# Patient Record
Sex: MALE | Race: WHITE | NOT HISPANIC OR LATINO | URBAN - METROPOLITAN AREA
[De-identification: names, ages, dates, MRNs, and addresses within clinical notes are randomized per-mention and may not be internally consistent; named-entity substitution may affect disease eponyms.]

---

## 2018-05-14 ENCOUNTER — EMERGENCY (EMERGENCY)
Facility: HOSPITAL | Age: 53
LOS: 1 days | Discharge: HOME | End: 2018-05-14
Attending: EMERGENCY MEDICINE
Payer: COMMERCIAL

## 2018-05-14 VITALS
WEIGHT: 229.94 LBS | OXYGEN SATURATION: 100 % | SYSTOLIC BLOOD PRESSURE: 143 MMHG | RESPIRATION RATE: 14 BRPM | DIASTOLIC BLOOD PRESSURE: 92 MMHG | HEIGHT: 70 IN | TEMPERATURE: 98 F | HEART RATE: 94 BPM

## 2018-05-14 VITALS
TEMPERATURE: 98 F | RESPIRATION RATE: 17 BRPM | HEART RATE: 84 BPM | DIASTOLIC BLOOD PRESSURE: 82 MMHG | SYSTOLIC BLOOD PRESSURE: 132 MMHG | OXYGEN SATURATION: 98 %

## 2018-05-14 PROCEDURE — 12002 RPR S/N/AX/GEN/TRNK2.6-7.5CM: CPT

## 2018-05-14 PROCEDURE — 99283 EMERGENCY DEPT VISIT LOW MDM: CPT | Mod: 25

## 2018-05-14 PROCEDURE — 90471 IMMUNIZATION ADMIN: CPT

## 2018-05-14 PROCEDURE — 90715 TDAP VACCINE 7 YRS/> IM: CPT

## 2018-05-14 RX ORDER — TETANUS TOXOID, REDUCED DIPHTHERIA TOXOID AND ACELLULAR PERTUSSIS VACCINE, ADSORBED 5; 2.5; 8; 8; 2.5 [IU]/.5ML; [IU]/.5ML; UG/.5ML; UG/.5ML; UG/.5ML
0.5 SUSPENSION INTRAMUSCULAR ONCE
Qty: 0 | Refills: 0 | Status: COMPLETED | OUTPATIENT
Start: 2018-05-14 | End: 2018-05-14

## 2018-05-14 RX ORDER — CEPHALEXIN 500 MG
1 CAPSULE ORAL
Qty: 14 | Refills: 0 | OUTPATIENT
Start: 2018-05-14 | End: 2018-05-20

## 2018-05-14 RX ORDER — CEPHALEXIN 500 MG
500 CAPSULE ORAL ONCE
Qty: 0 | Refills: 0 | Status: COMPLETED | OUTPATIENT
Start: 2018-05-14 | End: 2018-05-14

## 2018-05-14 RX ADMIN — TETANUS TOXOID, REDUCED DIPHTHERIA TOXOID AND ACELLULAR PERTUSSIS VACCINE, ADSORBED 0.5 MILLILITER(S): 5; 2.5; 8; 8; 2.5 SUSPENSION INTRAMUSCULAR at 18:14

## 2018-05-14 RX ADMIN — Medication 500 MILLIGRAM(S): at 18:14

## 2018-05-14 NOTE — ED PROVIDER NOTE - OBJECTIVE STATEMENT
Pt. present to ED stating that he got a cut to his abdomen while using a saw at work. Pt. not sure of his tetanus status. Pt. also has a cut to his right thumb. No other injuries. Pt. in the ED, bleeding controlled. Pt. works as a shelly.

## 2018-05-14 NOTE — ED PROVIDER NOTE - PROGRESS NOTE DETAILS
Laceration repaired. Pt. did not want the laceration to his right Thumb to be repaired. Dressing placed on both lacerations. Wound care instructions given to patient.

## 2018-05-14 NOTE — ED PROVIDER NOTE - GASTROINTESTINAL, MLM
Abdomen soft, non-tender and non-distended without organomegaly or masses. Normal bowel sounds. laceration to abdomen.

## 2018-05-14 NOTE — ED ADULT NURSE NOTE - OBJECTIVE STATEMENT
Pt. received alert and oriented x4 with chief complaint of laceration to upper midline abdomen and lower midline chest while working with saw.

## 2018-05-14 NOTE — ED PROVIDER NOTE - SKIN WOUND TYPE
7cm Linear laceration through skin and subcutaneous tissue of Left Mid abdomen. No muscle layers involved. No active bleeding./LACERATION(S)

## 2018-05-14 NOTE — ED PROVIDER NOTE - MUSCULOSKELETAL, MLM
Spine appears normal, range of motion is not limited, no muscle or joint tenderness. Superficial Vertical  laceration(2cm) to distal pad of Right thumb.

## 2021-12-04 ENCOUNTER — FORM ENCOUNTER (OUTPATIENT)
Age: 56
End: 2021-12-04

## 2021-12-05 ENCOUNTER — TRANSCRIPTION ENCOUNTER (OUTPATIENT)
Age: 56
End: 2021-12-05

## 2021-12-06 ENCOUNTER — TRANSCRIPTION ENCOUNTER (OUTPATIENT)
Age: 56
End: 2021-12-06

## 2022-02-25 ENCOUNTER — TRANSCRIPTION ENCOUNTER (OUTPATIENT)
Age: 57
End: 2022-02-25

## 2023-08-30 ENCOUNTER — APPOINTMENT (OUTPATIENT)
Dept: ORTHOPEDIC SURGERY | Facility: CLINIC | Age: 58
End: 2023-08-30
Payer: COMMERCIAL

## 2023-08-30 ENCOUNTER — NON-APPOINTMENT (OUTPATIENT)
Age: 58
End: 2023-08-30

## 2023-08-30 DIAGNOSIS — M25.511 PAIN IN RIGHT SHOULDER: ICD-10-CM

## 2023-08-30 DIAGNOSIS — M25.811 OTHER SPECIFIED JOINT DISORDERS, RIGHT SHOULDER: ICD-10-CM

## 2023-08-30 PROBLEM — Z00.00 ENCOUNTER FOR PREVENTIVE HEALTH EXAMINATION: Status: ACTIVE | Noted: 2023-08-30

## 2023-08-30 PROCEDURE — 99203 OFFICE O/P NEW LOW 30 MIN: CPT

## 2023-08-30 PROCEDURE — 73030 X-RAY EXAM OF SHOULDER: CPT | Mod: RT

## 2023-08-30 NOTE — DISCUSSION/SUMMARY
[de-identified] : Discussed findings of today's exam and possible causes of patient's pain.  Educated patient on their most probable diagnosis of right shoulder impingement.  Reviewed possible courses of treatment, and we collaboratively decided best course of treatment at this time will include conservative management.  Patient started on a course of oral NSAIDs, prescription given for Mobic (We discussed all possible side effects of this medication).  Patient will be started on a course of physical therapy to restore normal range of motion and strength as tolerated.  We discussed appropriate role and timing of cortisone injection, patient is insulin-dependent diabetic, recommend utilization of these above conservative measures first, may consider cortisone injection if not improving.  Follow up as needed.  Patient and spouse appreciate and agree with current plan.  I work as part of an academic orthopedic group and routinely have a physician in training (resident / fellow) working with me.  Any part of the history and physical exam performed by the physician in training was either directly reviewed and/or replicated by myself.  Any procedure performed by the physician in training was performed under my direct supervision and with the consent of the patient.  This note was generated using dragon medical dictation software.  A reasonable effort has been made for proofreading its contents, but typos may still remain.  If there are any questions or points of clarification needed please notify my office.

## 2023-08-30 NOTE — HISTORY OF PRESENT ILLNESS
[de-identified] : RHD Patient is here for right shoulder pain that began over a month ago. There was no inciting injury. This is his first evaluation. He tried ice to treat it. Denies prior injury. He works a labor intensive job.   The patient's past medical history, past surgical history, medications and allergies were reviewed by me today and documented accordingly. In addition, the patient's family and social history, which were noncontributory to this visit, were reviewed also. Intake form was reviewed. The patient has no family history of arthritis.

## 2023-08-30 NOTE — PHYSICAL EXAM
[de-identified] : Constitutional: Well-nourished, well-developed, No acute distress Respiratory:  Good respiratory effort, no SOB Psychiatric: Pleasant and normal affect, alert and oriented x3 Skin: Clean dry and intact B/L UE Musculoskeletal: normal except where as noted in regional exam   Left Shoulder: APPEARANCE: no marked deformities, no swelling or malalignment POSITIVE TENDERNESS: none NONTENDER: supraspinatus, infraspinatus, teres minor, LH biceps, anterior and posterior capsule, AC joint ROM: full & painless, no scapular winging or dyskinesia present RESISTIVE TESTING: painless 5/5 resisted flex/ext, empty can/ER/IR, horizontal abd/add  SPECIAL TESTS: neg Drop Arm, neg Empty Can, neg Yousif/Neers, neg Mckinley's, neg Speeds, neg Apprehension, neg cross arm adduction, neg apley's scratch test  Right Shoulder: APPEARANCE: no marked deformities, no swelling or malalignment POSITIVE TENDERNESS: supraspinatus, long head biceps tendon NONTENDER:  infraspinatus, teres minor. biceps. anterior and posterior capsule. AC joint.  ROM: full with mild painful arc past 60 degrees, no scapular winging or dyskinesia present RESISTIVE TESTING: MMT 4+/5 ER, Flexion and Empty can, 5/5 IR. painless 5/5 resisted ext, horizontal abd/add  SPECIAL TESTS: + Yousif and Neers, mildly + cross arm adduction, + Speeds, neg Mckinley's, neg Drop Arm, neg Apprehension. neg apley's scratch test  [de-identified] : The following radiographs were ordered and read by me during this patient's visit. I reviewed each radiograph in detail with the patient and discussed the findings as highlighted below.   3 views of the right shoulder were obtained today that show no fracture, or dislocation. There are no degenerative changes seen. There is no malalignment. No obvious osseous abnormality. Otherwise unremarkable.

## 2023-09-28 ENCOUNTER — OFFICE (OUTPATIENT)
Dept: URBAN - METROPOLITAN AREA CLINIC 109 | Facility: CLINIC | Age: 58
Setting detail: OPHTHALMOLOGY
End: 2023-09-28
Payer: COMMERCIAL

## 2023-09-28 DIAGNOSIS — H17.9: ICD-10-CM

## 2023-09-28 DIAGNOSIS — B00.52: ICD-10-CM

## 2023-09-28 DIAGNOSIS — E11.9: ICD-10-CM

## 2023-09-28 PROCEDURE — 92004 COMPRE OPH EXAM NEW PT 1/>: CPT | Performed by: OPHTHALMOLOGY

## 2023-09-28 PROCEDURE — 92025 CPTRIZED CORNEAL TOPOGRAPHY: CPT | Performed by: OPHTHALMOLOGY

## 2023-09-28 ASSESSMENT — VISUAL ACUITY
OS_BCVA: 20/20-2
OD_BCVA: 20/20-1

## 2023-09-28 ASSESSMENT — CONFRONTATIONAL VISUAL FIELD TEST (CVF)
OD_FINDINGS: FULL
OS_FINDINGS: FULL

## 2023-09-28 ASSESSMENT — TONOMETRY
OS_IOP_MMHG: 19
OD_IOP_MMHG: 19

## 2023-09-28 ASSESSMENT — CORNEAL SURGICAL SCARRING: OS_SCARRING: MID PERIPHERAL

## 2023-10-09 ENCOUNTER — RX ONLY (RX ONLY)
Age: 58
End: 2023-10-09

## 2023-10-09 ENCOUNTER — OFFICE (OUTPATIENT)
Dept: URBAN - METROPOLITAN AREA CLINIC 109 | Facility: CLINIC | Age: 58
Setting detail: OPHTHALMOLOGY
End: 2023-10-09
Payer: COMMERCIAL

## 2023-10-09 DIAGNOSIS — B00.52: ICD-10-CM

## 2023-10-09 DIAGNOSIS — H17.9: ICD-10-CM

## 2023-10-09 PROBLEM — E11.9 DIABETES TYPE 2 NO RETINOPATHY ; BOTH EYES: Status: ACTIVE | Noted: 2023-09-28

## 2023-10-09 PROCEDURE — 92012 INTRM OPH EXAM EST PATIENT: CPT | Performed by: OPHTHALMOLOGY

## 2023-10-09 ASSESSMENT — VISUAL ACUITY
OD_BCVA: 20/25-2
OS_BCVA: 20/20

## 2023-10-09 ASSESSMENT — TONOMETRY: OD_IOP_MMHG: 18

## 2023-10-09 ASSESSMENT — CORNEAL SURGICAL SCARRING: OS_SCARRING: MID PERIPHERAL

## 2023-10-09 ASSESSMENT — CONFRONTATIONAL VISUAL FIELD TEST (CVF)
OD_FINDINGS: FULL
OS_FINDINGS: FULL

## 2023-10-25 ENCOUNTER — OFFICE (OUTPATIENT)
Dept: URBAN - METROPOLITAN AREA CLINIC 109 | Facility: CLINIC | Age: 58
Setting detail: OPHTHALMOLOGY
End: 2023-10-25
Payer: COMMERCIAL

## 2023-10-25 ENCOUNTER — RX ONLY (RX ONLY)
Age: 58
End: 2023-10-25

## 2023-10-25 DIAGNOSIS — B00.52: ICD-10-CM

## 2023-10-25 PROCEDURE — 92012 INTRM OPH EXAM EST PATIENT: CPT | Performed by: OPHTHALMOLOGY

## 2023-10-25 ASSESSMENT — SPHEQUIV_DERIVED
OS_SPHEQUIV: 1.375
OD_SPHEQUIV: 1

## 2023-10-25 ASSESSMENT — REFRACTION_AUTOREFRACTION
OS_AXIS: 081
OD_SPHERE: +1.25
OS_SPHERE: +1.50
OD_CYLINDER: -0.50
OD_AXIS: 028
OS_CYLINDER: -0.25

## 2023-10-25 ASSESSMENT — VISUAL ACUITY
OS_BCVA: 20/20
OD_BCVA: 20/25-2

## 2023-10-25 ASSESSMENT — CONFRONTATIONAL VISUAL FIELD TEST (CVF)
OD_FINDINGS: FULL
OS_FINDINGS: FULL

## 2023-10-25 ASSESSMENT — CORNEAL SURGICAL SCARRING: OS_SCARRING: MID PERIPHERAL

## 2023-10-25 ASSESSMENT — TONOMETRY: OS_IOP_MMHG: 18

## 2023-11-20 ENCOUNTER — RX RENEWAL (OUTPATIENT)
Age: 58
End: 2023-11-20

## 2023-11-20 RX ORDER — MELOXICAM 7.5 MG/1
7.5 TABLET ORAL
Qty: 30 | Refills: 0 | Status: ACTIVE | COMMUNITY
Start: 2023-08-30 | End: 1900-01-01

## 2023-12-01 ENCOUNTER — OFFICE (OUTPATIENT)
Dept: URBAN - METROPOLITAN AREA CLINIC 109 | Facility: CLINIC | Age: 58
Setting detail: OPHTHALMOLOGY
End: 2023-12-01
Payer: COMMERCIAL

## 2023-12-01 DIAGNOSIS — B00.52: ICD-10-CM

## 2023-12-01 DIAGNOSIS — H16.041: ICD-10-CM

## 2023-12-01 PROCEDURE — 92012 INTRM OPH EXAM EST PATIENT: CPT | Performed by: OPHTHALMOLOGY

## 2023-12-01 ASSESSMENT — CONFRONTATIONAL VISUAL FIELD TEST (CVF)
OD_FINDINGS: FULL
OS_FINDINGS: FULL

## 2023-12-01 ASSESSMENT — REFRACTION_AUTOREFRACTION
OS_AXIS: 112
OD_SPHERE: +1.00
OD_AXIS: 024
OS_CYLINDER: -0.25
OS_SPHERE: +1.50
OD_CYLINDER: -0.50

## 2023-12-01 ASSESSMENT — CORNEAL SURGICAL SCARRING: OS_SCARRING: MID PERIPHERAL

## 2023-12-01 ASSESSMENT — SPHEQUIV_DERIVED
OD_SPHEQUIV: 0.75
OS_SPHEQUIV: 1.375

## 2025-01-14 NOTE — ED ADULT NURSE NOTE - CHIEF COMPLAINT QUOTE
[facility] called nurse Negro they need the instructions and wound supplies  need for her wounds. The call back number is [phone] and the Fax number is [phone]   Sustained a laceration to abdomen while working in a machine. No bleeding at this time.